# Patient Record
Sex: MALE | Race: WHITE | NOT HISPANIC OR LATINO
[De-identification: names, ages, dates, MRNs, and addresses within clinical notes are randomized per-mention and may not be internally consistent; named-entity substitution may affect disease eponyms.]

---

## 2020-09-25 DIAGNOSIS — K21.9 GASTRO-ESOPHAGEAL REFLUX DISEASE W/OUT ESOPHAGITIS: ICD-10-CM

## 2020-09-25 DIAGNOSIS — Z90.79 ACQUIRED ABSENCE OF OTHER GENITAL ORGAN(S): ICD-10-CM

## 2020-09-25 DIAGNOSIS — Z85.46 PERSONAL HISTORY OF MALIGNANT NEOPLASM OF PROSTATE: ICD-10-CM

## 2020-09-25 PROBLEM — Z00.00 ENCOUNTER FOR PREVENTIVE HEALTH EXAMINATION: Status: ACTIVE | Noted: 2020-09-25

## 2020-09-25 RX ORDER — APIXABAN 5 MG/1
5 TABLET, FILM COATED ORAL
Refills: 0 | Status: ACTIVE | COMMUNITY
Start: 2020-09-25

## 2020-09-25 RX ORDER — ATORVASTATIN CALCIUM 10 MG/1
10 TABLET, FILM COATED ORAL
Refills: 0 | Status: ACTIVE | COMMUNITY
Start: 2020-09-25

## 2020-09-25 RX ORDER — DEXLANSOPRAZOLE 60 MG/1
60 CAPSULE, DELAYED RELEASE ORAL DAILY
Refills: 0 | Status: ACTIVE | COMMUNITY
Start: 2020-09-25

## 2020-09-28 ENCOUNTER — APPOINTMENT (OUTPATIENT)
Dept: CARE COORDINATION | Facility: HOME HEALTH | Age: 75
End: 2020-09-28
Payer: MEDICARE

## 2020-09-28 VITALS — SYSTOLIC BLOOD PRESSURE: 130 MMHG | RESPIRATION RATE: 18 BRPM | DIASTOLIC BLOOD PRESSURE: 70 MMHG | HEART RATE: 55 BPM

## 2020-09-28 DIAGNOSIS — I10 ESSENTIAL (PRIMARY) HYPERTENSION: ICD-10-CM

## 2020-09-28 DIAGNOSIS — E78.5 HYPERLIPIDEMIA, UNSPECIFIED: ICD-10-CM

## 2020-09-28 DIAGNOSIS — I48.0 PAROXYSMAL ATRIAL FIBRILLATION: ICD-10-CM

## 2020-09-28 DIAGNOSIS — I21.4 NON-ST ELEVATION (NSTEMI) MYOCARDIAL INFARCTION: ICD-10-CM

## 2020-09-28 DIAGNOSIS — Z98.890 OTHER SPECIFIED POSTPROCEDURAL STATES: ICD-10-CM

## 2020-09-28 PROCEDURE — 99349 HOME/RES VST EST MOD MDM 40: CPT

## 2020-09-28 PROCEDURE — 96127 BRIEF EMOTIONAL/BEHAV ASSMT: CPT

## 2020-09-28 RX ORDER — METOPROLOL SUCCINATE 25 MG/1
25 TABLET, EXTENDED RELEASE ORAL DAILY
Refills: 0 | Status: ACTIVE | COMMUNITY
Start: 2020-09-28

## 2020-09-28 RX ORDER — METOPROLOL SUCCINATE 25 MG/1
25 TABLET, EXTENDED RELEASE ORAL DAILY
Refills: 0 | Status: DISCONTINUED | COMMUNITY
Start: 2020-09-25 | End: 2020-09-28

## 2020-09-28 NOTE — PLAN
[FreeTextEntry1] : Pt  denies any chest pain, SOB, palpitation or fevers. Pt continues to take his medications as prescribed including anticoagulation therapy. Pt has follow appointment with PCP Dr Shane Rubi October 14. Pt reminded to schedule an appointment with cardiologist Dr. Brooks. Smoking and alcohol cessation recommended Pt denies any headache, back pain, difficulty walking, intractable nausea/vomiting.  \par Patient is post op cardiac catheterization. Pt reminded to limit your physical activity for 7 days or until cleared by cardiologist.Do not engage in sports, heavy work or heaving lifting for 7 days. Avoid alcohol beverages for 24 hours, drinking water is encouraged unless otherwise directed. You may resume your usual diet. You may shower today but bath tub or swimming for 5 days. Mild pain at the puncture site is not unusual. If pain last for more than 2 days, contact cardiologist who performed procedure.\par Pt reminded to call this NP for any questions/concerns.\par \par

## 2020-09-28 NOTE — PHYSICAL EXAM
[No Acute Distress] : no acute distress [Well Nourished] : well nourished [Well Developed] : well developed [No JVD] : no jugular venous distention [No Respiratory Distress] : no respiratory distress  [No Accessory Muscle Use] : no accessory muscle use [Clear to Auscultation] : lungs were clear to auscultation bilaterally [No Murmur] : no murmur heard [No Edema] : there was no peripheral edema [Soft] : abdomen soft [Non Tender] : non-tender [Non-distended] : non-distended [Normal Bowel Sounds] : normal bowel sounds [Normal] : soft, non-tender, non-distended, no masses palpated, no HSM and normal bowel sounds [Speech Grossly Normal] : speech grossly normal [Normal Affect] : the affect was normal [Alert and Oriented x3] : oriented to person, place, and time [de-identified] : Rate 55, metoprolol 12.5 mg BID changed to QD by Dr Rubi

## 2020-09-28 NOTE — HEALTH RISK ASSESSMENT
[No] : No [No falls in past year] : Patient reported no falls in the past year [0] : 2) Feeling down, depressed, or hopeless: Not at all (0) [] : No [de-identified] : None [[id number]] : 0

## 2020-09-28 NOTE — HISTORY OF PRESENT ILLNESS
[Spouse] : spouse [FreeTextEntry1] : Follow up on patient who is s/p hospitalization due to NSTEMI, home visit done for [facility] program. Pt was admitted to [facility] then transfer to [facility] from 9/22/2020 to 9/24/2020 due to cardiac catherization, a-fib, NSTEMI. [de-identified] : Per [facility], "Patient is a 75YOM with a PMHx of hypothyroidism, HTN, HLD and GERD  who presented to ED at [facility] was noted to have Rapid a.fib and converted to sinus with medication. Patient was noted to have elevated troponin T and was transferred to [facility] [place] for cardiac cath . Patient was monitored on telemetry and remained in sinus rhythm. 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 no stents placed. Patient's troponin elevation was thought to be from TYPE 2 MI.  Patient was started on Eliquis, low dose metoprolol and lipitor and discharged home. Advised follow up with outpt cardiologist.\par TCM COVID-19 screening protocol reviewed with patient and/or caregiver and denies any signs and symptoms.  Patient and/or care giver denies any contact with a suspect or known COVID-19 case within the last 14 days.  Patient and/or caregiver have tested negative for COVID-19. Patient and/or caregiver does not live in an assisted living or skilled nursing facility.  Patient and/or care giver [place] 14 [place]. \par Patient and/or caregiver verbalized understanding of the need to wear a mask or face covering during visit or one can be provided one if needed.  Patient and/or caregiver verbalized understanding that a temperature is to be taken on day of visit and if greater than 100 degrees F (37.8C) to inform the care navigator prior to visit. The patient and/or caregiver verbalized understanding that the care navigator will be in full PPE, will require a clear area to don and doff, and will have own waste bag to dispose of PPE in the home at the conclusion of the visit.

## 2020-09-30 ENCOUNTER — NON-APPOINTMENT (OUTPATIENT)
Age: 75
End: 2020-09-30

## 2021-02-25 ENCOUNTER — TRANSCRIPTION ENCOUNTER (OUTPATIENT)
Age: 76
End: 2021-02-25

## 2021-03-07 ENCOUNTER — TRANSCRIPTION ENCOUNTER (OUTPATIENT)
Age: 76
End: 2021-03-07

## 2024-09-07 ENCOUNTER — RESULT REVIEW (OUTPATIENT)
Age: 79
End: 2024-09-07

## 2024-09-07 ENCOUNTER — TRANSCRIPTION ENCOUNTER (OUTPATIENT)
Age: 79
End: 2024-09-07